# Patient Record
Sex: FEMALE | Race: BLACK OR AFRICAN AMERICAN | Employment: UNEMPLOYED | URBAN - METROPOLITAN AREA
[De-identification: names, ages, dates, MRNs, and addresses within clinical notes are randomized per-mention and may not be internally consistent; named-entity substitution may affect disease eponyms.]

---

## 2017-02-02 ENCOUNTER — APPOINTMENT (OUTPATIENT)
Dept: GENERAL RADIOLOGY | Age: 57
End: 2017-02-02
Attending: EMERGENCY MEDICINE
Payer: SELF-PAY

## 2017-02-02 ENCOUNTER — HOSPITAL ENCOUNTER (EMERGENCY)
Age: 57
Discharge: HOME | End: 2017-02-03
Attending: EMERGENCY MEDICINE
Payer: SELF-PAY

## 2017-02-02 DIAGNOSIS — R07.9 CHEST PAIN, UNSPECIFIED TYPE: ICD-10-CM

## 2017-02-02 DIAGNOSIS — R06.02 SOB (SHORTNESS OF BREATH): Primary | ICD-10-CM

## 2017-02-02 DIAGNOSIS — I10 ESSENTIAL HYPERTENSION: ICD-10-CM

## 2017-02-02 DIAGNOSIS — R31.9 HEMATURIA: ICD-10-CM

## 2017-02-02 DIAGNOSIS — R73.9 HYPERGLYCEMIA: ICD-10-CM

## 2017-02-02 DIAGNOSIS — R10.13 ABDOMINAL PAIN, EPIGASTRIC: ICD-10-CM

## 2017-02-02 DIAGNOSIS — R05.9 COUGH: ICD-10-CM

## 2017-02-02 DIAGNOSIS — R10.9 ACUTE LEFT FLANK PAIN: ICD-10-CM

## 2017-02-02 DIAGNOSIS — R74.01 ELEVATED AST (SGOT): ICD-10-CM

## 2017-02-02 DIAGNOSIS — R77.8 ELEVATED TROPONIN: ICD-10-CM

## 2017-02-02 DIAGNOSIS — R93.5 ABNORMAL ABDOMINAL CT SCAN: ICD-10-CM

## 2017-02-02 DIAGNOSIS — R11.0 NAUSEA WITHOUT VOMITING: ICD-10-CM

## 2017-02-02 PROCEDURE — 96361 HYDRATE IV INFUSION ADD-ON: CPT

## 2017-02-02 PROCEDURE — 87086 URINE CULTURE/COLONY COUNT: CPT | Performed by: PHYSICIAN ASSISTANT

## 2017-02-02 PROCEDURE — 81001 URINALYSIS AUTO W/SCOPE: CPT | Performed by: PHYSICIAN ASSISTANT

## 2017-02-02 PROCEDURE — 77030013140 HC MSK NEB VYRM -A

## 2017-02-02 PROCEDURE — 96374 THER/PROPH/DIAG INJ IV PUSH: CPT

## 2017-02-02 PROCEDURE — 99285 EMERGENCY DEPT VISIT HI MDM: CPT

## 2017-02-02 PROCEDURE — 93005 ELECTROCARDIOGRAM TRACING: CPT

## 2017-02-02 PROCEDURE — 94640 AIRWAY INHALATION TREATMENT: CPT

## 2017-02-02 PROCEDURE — 96375 TX/PRO/DX INJ NEW DRUG ADDON: CPT

## 2017-02-02 PROCEDURE — 71020 XR CHEST PA LAT: CPT

## 2017-02-02 RX ORDER — METFORMIN HYDROCHLORIDE 500 MG/1
500 TABLET ORAL 2 TIMES DAILY WITH MEALS
COMMUNITY
End: 2017-02-02 | Stop reason: SDUPTHER

## 2017-02-02 NOTE — Clinical Note
Drink plenty of water as the more hydrated you remain, the thinner your secretion/sputum will be which which in turn should help it come up and out of the upper and lower airways. Use your Albuterol inhaler WITH A Spacer (Aerochamber device): 2 puffs  every 4 hours as needed for cough, wheeze, chest tightness, shortness of breath until symptoms improve. Note: The medication Albuterol helps open the lower airways but, only is used as directed. If it not used properly it will likely stick to the mo uth and throat and never enter the lower airways of the lungs as intended and thereby not work as effectively. REMEMBER TO ASK YOUR LOCAL PHARMACIST FOR HELP DEMONSTRATING HOW TO USE/TAKE YOUR MEDICATIONS PRIOR TO LEAVING THE PHARMACY. ENSURE YOU REV IEW ALL OF THE INFORMATION PROVIDED WITH ANY MEDICATIONS AND OR MEDICAL DEVICES. Please take your medications as prescribed without fail. Avoid exercise, high impact activities until you are well again. PLEASE FOLLOW-UP AS DIRECTED WITHOUT KATIE L WITHIN THE TIME FRAME RECOMMENDED AS FAILURE TO DO SO COULD RESULT IN WORSENING OF YOUR PHYSICAL CONDITION, DEATH, AND OR PERMANENT DISABILITY. RETURN TO THE EMERGENCY DEPARTMENT IF YOU ARE UNABLE TO FOLLOW-UP AS DIRECTED. RETURN TO THE EMERGEN CY DEPARTMENT IF YOU HAVE SYMPTOMS THAT DO NOT IMPROVE WITH TREATMENT, NEW SYMPTOMS, WORSENING SYMPTOMS, OR ANY OTHER CONCERNS. Ga Baron 
 
(Formerly: [facility] and [facility]) [facility]: In 1992, the [facility] the [facility] established a [employer], representing all major [employer], conducted studies that revealed both a shortage and misdistribution of primary care physi marisabel able to provide primary care to an estimated 37,000 medically indigent [place] residents. Many of these individuals were gainfully employed but did not have health insurance coverage. As a result, a large number of uninsured residents, as well  as those insured by [facility] and Medicare, but unable to find a doctor, resorted to the inappropriate use of the [facility], [facility] department and the hospital emergency rooms for their primary care. [facility], formerly known as [address] [place] [address], opened its doors as a federally qualified health center on May 14, 1995, in [place], [address] [place] [address], with two doctors, one family nurse practitioner and eleven clinical support and administrative staff. In [address] [place] [address], expanded its services to the [facility]. The [place] site is called [employer], and it is located at the corner of [address] [place] [address], directly across from [facility] and [facility]. During 20 09 the two sites together served more than 8,621 patients with [phone] medical and dental patient visits. Over 22% of the patients were diagnosed with hypertension, 15% with diabetes and over 45% received well child examinations. Services include but  are not limited to the following: 
Treatment for both acute and chronic illnesses such as diabetes, stroke, hypertension, diet and nutrition Programs and [place] Services include but are not limited to the following: 
Adult and Pediatric health care Diagnosis and treatment of minor and chronic illnesses Flu and Pneumonia vaccines Laboratory services Medication assistance Health education services Case management services Referral program to specialty providers [facility] planning services Prenatal care and post partum visits Healthy Infant Planning Pediatric well baby, immunizations and school/sports physicals Health Care for the [facility] accepts all commercial insurances, Medic aid and [employer]. Individuals without insurance pay according to a sliding fee scale based on the income of their household and Federal Poverty Guidelines. OFFICE LOCATIONS:  
[facility] [address] [address] [address] [place] [address] Before you begin, please have the following information ready: 
[employer] card Patients place of employment, including address and phone number Patients primary care  provider name and contact information Business Hours Hours of Operation [facility] [address] [place], [address] Monday thru Friday 8:00 AM  5:00 PM 
Every 2nd Saturday 8:00 AM  12:00 PM 
 
 
[employer] [phone] [address] [place] [address] Tito Monday, Tuesday, Thursday 8:00 AM  7:00 PM 
Wednesday and Friday 8:00 AM  5:00 PM 
Every 1st, 3rd and 4th Saturday 8:00 AM  12:30 PM 
 
 
[address] [address] [facility] [address] Monday thru Friday 8:00 AM  5:00 PM 
 
After Hours If you have a life threatening emergency, please call 911. Please identify your provider by name when you obtain services at a local emergency room, urgent care center or you  are admitted as inpatient into the hospital. 
 
Our physicians and dentists are on call 24 hours a day, 7 days a week 365 days per year. You can reach your physician or dentist by calling [phone]. Please Note All locations are closed on Kimper ays and all major holidays [address] Dental services are available for adults and children at the [place] site and via the mobile dental Ashley Rough in all areas of [facility]. Services include: 
Examinations and X-rays: *  Review and update medical history in order to ensure that dental treatment does not conflict with current health conditions or medications* Oral cancer examination* Visual examination and x-rays of teeth to help detect decay or deterioration* Observation  of biting, chewing and grinding patterns* Evaluation of gum tissue and bone support* Preventative and restorative dental care* Stain removal, removal of plaque and tartar and teeth polishing* Recommendations on brushing, flossing, fluoride applications,  sealants and frequency of oral examinations* Discussions of concerns or questions regarding regular oral care or possible cosmetic enhancements Preventive: * Stain, plaque and tartar removal and teeth polishing* Deep scale using anesthetic when necess chloe* Recommendations on brushing and flossing* Fluoride applications and dental sealants as needed* Discussion on the frequency for oral examination Restorative Visits: * Dental fillings such as amalgam or composite restorations* Dental extractions* Ro ot canals of anterior teeth* Partial or complete dentures Emergency Visits: * Extraction or tooth removal. (An appointment to alleviate pain is usually available within 24-48 hours.) Office Hours: Monday-Friday: 8:00am-5:00pm 
For an appointment nikki l: [phone] Fax: [phone] 
Stewart Ybarra [address] [place], [facility]  DON'T HAVE INSURANCE?  NEED EVALUATION FOR GENERAL MEDICAL PROBLEMS BECAUSE YOU DON'T HAVE A PRIMARY CARE DOCTOR OR ARE AWAITING AN APPOINTMENT WITH A CLINIC BUT NEED TO BE SEEN BEFORE AN APPOINTMENT IS AVAILABLE? The [facility] [phone]: is a free medical service that provides general medical care to uninsured adults and children in [address] on the [place]. Serv ices include routine evaluation and treatment of common acute illnesses including: 
Gastritis and heart burn Minor skin rashes Minor musculoskeletal pains Respiratory infections Urinary tract and bladder infections Headaches, ear aches, and pink eye Chronic conditions such as diabetes and hypertension The [facility] also offers sports physicals, children's health insurance enrollment, and health education services. Medical conditions that are beyond the team's scope of care are referred to anot her care setting. The [facility] team is bilingual and composed of registered nurses, licensed practical nurses, physicians, patient technicians, and outreach workers. Community partners, including free clinics, local health agencies, and numerous [place] -based [facility], collaborate with us. [employer], grants, and contributions sponsors the [facility] to ensure that services are free for all patients. To learn more about our [address] including locations and times pleas e call us at [address] [place] [address] ([phone]).

## 2017-02-03 ENCOUNTER — APPOINTMENT (OUTPATIENT)
Dept: CT IMAGING | Age: 57
End: 2017-02-03
Attending: PHYSICIAN ASSISTANT
Payer: SELF-PAY

## 2017-02-03 VITALS
WEIGHT: 160 LBS | OXYGEN SATURATION: 94 % | TEMPERATURE: 99 F | SYSTOLIC BLOOD PRESSURE: 136 MMHG | DIASTOLIC BLOOD PRESSURE: 70 MMHG | HEART RATE: 76 BPM | RESPIRATION RATE: 16 BRPM

## 2017-02-03 LAB
ALBUMIN SERPL BCP-MCNC: 2.9 G/DL (ref 3.4–5)
ALBUMIN/GLOB SERPL: 0.8 {RATIO} (ref 0.8–1.7)
ALP SERPL-CCNC: 77 U/L (ref 45–117)
ALT SERPL-CCNC: 56 U/L (ref 13–56)
ANION GAP BLD CALC-SCNC: 9 MMOL/L (ref 3–18)
APPEARANCE UR: CLEAR
AST SERPL W P-5'-P-CCNC: 39 U/L (ref 15–37)
ATRIAL RATE: 71 BPM
ATRIAL RATE: 79 BPM
BACTERIA URNS QL MICRO: NEGATIVE /HPF
BASOPHILS # BLD AUTO: 0 K/UL (ref 0–0.06)
BASOPHILS # BLD: 1 % (ref 0–2)
BILIRUB SERPL-MCNC: 0.3 MG/DL (ref 0.2–1)
BILIRUB UR QL: NEGATIVE
BNP SERPL-MCNC: 20 PG/ML (ref 0–900)
BUN SERPL-MCNC: 9 MG/DL (ref 7–18)
BUN/CREAT SERPL: 12 (ref 12–20)
CALCIUM SERPL-MCNC: 8.3 MG/DL (ref 8.5–10.1)
CALCULATED P AXIS, ECG09: 53 DEGREES
CALCULATED P AXIS, ECG09: 65 DEGREES
CALCULATED R AXIS, ECG10: -5 DEGREES
CALCULATED R AXIS, ECG10: 3 DEGREES
CALCULATED T AXIS, ECG11: 57 DEGREES
CALCULATED T AXIS, ECG11: 63 DEGREES
CHLORIDE SERPL-SCNC: 107 MMOL/L (ref 100–108)
CK MB CFR SERPL CALC: 0.4 % (ref 0–4)
CK MB SERPL-MCNC: 0.9 NG/ML (ref 0.5–3.6)
CK SERPL-CCNC: 213 U/L (ref 26–192)
CO2 SERPL-SCNC: 25 MMOL/L (ref 21–32)
COLOR UR: YELLOW
CREAT SERPL-MCNC: 0.78 MG/DL (ref 0.6–1.3)
DIAGNOSIS, 93000: NORMAL
DIAGNOSIS, 93000: NORMAL
DIFFERENTIAL METHOD BLD: NORMAL
EOSINOPHIL # BLD: 0.1 K/UL (ref 0–0.4)
EOSINOPHIL NFR BLD: 2 % (ref 0–5)
EPITH CASTS URNS QL MICRO: NORMAL /LPF (ref 0–5)
ERYTHROCYTE [DISTWIDTH] IN BLOOD BY AUTOMATED COUNT: 13.1 % (ref 11.6–14.5)
GLOBULIN SER CALC-MCNC: 3.7 G/DL (ref 2–4)
GLUCOSE SERPL-MCNC: 212 MG/DL (ref 74–99)
GLUCOSE UR STRIP.AUTO-MCNC: 250 MG/DL
HCT VFR BLD AUTO: 36.6 % (ref 35–45)
HGB BLD-MCNC: 12 G/DL (ref 12–16)
HGB UR QL STRIP: ABNORMAL
KETONES UR QL STRIP.AUTO: NEGATIVE MG/DL
LEUKOCYTE ESTERASE UR QL STRIP.AUTO: NEGATIVE
LIPASE SERPL-CCNC: 203 U/L (ref 73–393)
LYMPHOCYTES # BLD AUTO: 48 % (ref 21–52)
LYMPHOCYTES # BLD: 3 K/UL (ref 0.9–3.6)
MCH RBC QN AUTO: 27.4 PG (ref 24–34)
MCHC RBC AUTO-ENTMCNC: 32.8 G/DL (ref 31–37)
MCV RBC AUTO: 83.6 FL (ref 74–97)
MONOCYTES # BLD: 0.5 K/UL (ref 0.05–1.2)
MONOCYTES NFR BLD AUTO: 8 % (ref 3–10)
NEUTS SEG # BLD: 2.5 K/UL (ref 1.8–8)
NEUTS SEG NFR BLD AUTO: 41 % (ref 40–73)
NITRITE UR QL STRIP.AUTO: NEGATIVE
P-R INTERVAL, ECG05: 158 MS
P-R INTERVAL, ECG05: 162 MS
PH UR STRIP: 5 [PH] (ref 5–8)
PLATELET # BLD AUTO: 185 K/UL (ref 135–420)
PMV BLD AUTO: 10.2 FL (ref 9.2–11.8)
POTASSIUM SERPL-SCNC: 4.6 MMOL/L (ref 3.5–5.5)
PROT SERPL-MCNC: 6.6 G/DL (ref 6.4–8.2)
PROT UR STRIP-MCNC: 100 MG/DL
Q-T INTERVAL, ECG07: 414 MS
Q-T INTERVAL, ECG07: 424 MS
QRS DURATION, ECG06: 74 MS
QRS DURATION, ECG06: 76 MS
QTC CALCULATION (BEZET), ECG08: 449 MS
QTC CALCULATION (BEZET), ECG08: 486 MS
RBC # BLD AUTO: 4.38 M/UL (ref 4.2–5.3)
RBC #/AREA URNS HPF: NORMAL /HPF (ref 0–5)
SODIUM SERPL-SCNC: 141 MMOL/L (ref 136–145)
SP GR UR REFRACTOMETRY: 1.01 (ref 1–1.03)
TROPONIN I BLD-MCNC: <0.04 NG/ML (ref 0–0.08)
TROPONIN I SERPL-MCNC: 0.06 NG/ML (ref 0–0.04)
UROBILINOGEN UR QL STRIP.AUTO: 0.2 EU/DL (ref 0.2–1)
VENTRICULAR RATE, ECG03: 71 BPM
VENTRICULAR RATE, ECG03: 79 BPM
WBC # BLD AUTO: 6.1 K/UL (ref 4.6–13.2)
WBC URNS QL MICRO: NORMAL /HPF (ref 0–4)

## 2017-02-03 PROCEDURE — 83880 ASSAY OF NATRIURETIC PEPTIDE: CPT | Performed by: PHYSICIAN ASSISTANT

## 2017-02-03 PROCEDURE — 74011250637 HC RX REV CODE- 250/637: Performed by: PHYSICIAN ASSISTANT

## 2017-02-03 PROCEDURE — 84484 ASSAY OF TROPONIN QUANT: CPT | Performed by: PHYSICIAN ASSISTANT

## 2017-02-03 PROCEDURE — 74011250636 HC RX REV CODE- 250/636: Performed by: PHYSICIAN ASSISTANT

## 2017-02-03 PROCEDURE — 74176 CT ABD & PELVIS W/O CONTRAST: CPT

## 2017-02-03 PROCEDURE — 80053 COMPREHEN METABOLIC PANEL: CPT | Performed by: PHYSICIAN ASSISTANT

## 2017-02-03 PROCEDURE — 74011000250 HC RX REV CODE- 250: Performed by: PHYSICIAN ASSISTANT

## 2017-02-03 PROCEDURE — 83690 ASSAY OF LIPASE: CPT | Performed by: PHYSICIAN ASSISTANT

## 2017-02-03 PROCEDURE — 93005 ELECTROCARDIOGRAM TRACING: CPT

## 2017-02-03 PROCEDURE — 85025 COMPLETE CBC W/AUTO DIFF WBC: CPT | Performed by: PHYSICIAN ASSISTANT

## 2017-02-03 PROCEDURE — 82550 ASSAY OF CK (CPK): CPT | Performed by: PHYSICIAN ASSISTANT

## 2017-02-03 RX ORDER — ONDANSETRON 2 MG/ML
4 INJECTION INTRAMUSCULAR; INTRAVENOUS
Status: COMPLETED | OUTPATIENT
Start: 2017-02-03 | End: 2017-02-03

## 2017-02-03 RX ORDER — CLONIDINE HYDROCHLORIDE 0.1 MG/1
1 TABLET ORAL DAILY
Refills: 0 | COMMUNITY
Start: 2016-11-23 | End: 2022-06-09

## 2017-02-03 RX ORDER — FAMOTIDINE 10 MG/ML
20 INJECTION INTRAVENOUS
Status: COMPLETED | OUTPATIENT
Start: 2017-02-03 | End: 2017-02-03

## 2017-02-03 RX ORDER — ASPIRIN 325 MG
325 TABLET ORAL
Status: COMPLETED | OUTPATIENT
Start: 2017-02-03 | End: 2017-02-03

## 2017-02-03 RX ORDER — ALBUTEROL SULFATE 90 UG/1
2 AEROSOL, METERED RESPIRATORY (INHALATION)
Qty: 1 INHALER | Refills: 0 | Status: SHIPPED | OUTPATIENT
Start: 2017-02-03 | End: 2021-12-07

## 2017-02-03 RX ORDER — LISINOPRIL 10 MG/1
1 TABLET ORAL DAILY
Refills: 0 | COMMUNITY
Start: 2016-12-27 | End: 2021-12-07

## 2017-02-03 RX ORDER — CODEINE PHOSPHATE AND GUAIFENESIN 10; 100 MG/5ML; MG/5ML
10 SOLUTION ORAL
Qty: 120 ML | Refills: 0 | Status: SHIPPED | OUTPATIENT
Start: 2017-02-03 | End: 2021-12-07

## 2017-02-03 RX ORDER — LOVASTATIN 10 MG/1
1 TABLET ORAL
Refills: 0 | COMMUNITY
Start: 2016-12-27 | End: 2022-06-09

## 2017-02-03 RX ORDER — IPRATROPIUM BROMIDE AND ALBUTEROL SULFATE 2.5; .5 MG/3ML; MG/3ML
3 SOLUTION RESPIRATORY (INHALATION) ONCE
Status: COMPLETED | OUTPATIENT
Start: 2017-02-03 | End: 2017-02-03

## 2017-02-03 RX ORDER — AZITHROMYCIN 250 MG/1
TABLET, FILM COATED ORAL
Qty: 6 TAB | Refills: 0 | Status: SHIPPED | OUTPATIENT
Start: 2017-02-03 | End: 2021-12-07

## 2017-02-03 RX ORDER — GLIPIZIDE 10 MG/1
1 TABLET ORAL DAILY
Refills: 0 | COMMUNITY
Start: 2016-11-23

## 2017-02-03 RX ORDER — METFORMIN HYDROCHLORIDE 1000 MG/1
1 TABLET ORAL 2 TIMES DAILY
Refills: 0 | COMMUNITY
Start: 2016-11-23

## 2017-02-03 RX ADMIN — IPRATROPIUM BROMIDE AND ALBUTEROL SULFATE 3 ML: .5; 3 SOLUTION RESPIRATORY (INHALATION) at 01:12

## 2017-02-03 RX ADMIN — ONDANSETRON 4 MG: 2 INJECTION INTRAMUSCULAR; INTRAVENOUS at 01:12

## 2017-02-03 RX ADMIN — SODIUM CHLORIDE 500 ML: 900 INJECTION, SOLUTION INTRAVENOUS at 01:12

## 2017-02-03 RX ADMIN — FAMOTIDINE 20 MG: 10 INJECTION, SOLUTION INTRAVENOUS at 01:12

## 2017-02-03 RX ADMIN — ASPIRIN 325 MG ORAL TABLET 325 MG: 325 PILL ORAL at 02:09

## 2017-02-03 NOTE — ED NOTES
I have reviewed discharge instructions with the patient. The patient verbalized understanding. Discharge medications reviewed with patient and son and appropriate educational materials and side effects teaching were provided. Patient armband removed and shredded.

## 2017-02-03 NOTE — ED PROVIDER NOTES
HPI Comments: Deborah Espinoza is a  64 y.o. Normal build  [place] female non-smoker h/o HLP, HTN, NIDDM presents to the ED via POV w/ son c/o dry cough sporadic x 2 weeks. She admits chest discomfort at times, and feels \"winded\" over the last few days. She admits intermittent upper abdominal pain and left flank pain. Her son says she has been c/o nausea and has spit up a couple times. Denies fever/chills, dizziness/LOC, HA, sinus pain/congestion/runny nose, st, jaw/neck pain/stiffness, palpitations, ROE, orthopnea, calf pain/swelling, hemoptysis, wheeze, chest tightness, blood in vomitus, constipation, brbpr, melena, blood in urine, decreased urination, difficulty urinating, extremity pain/weakness/numbness/tingling. Patient is a 64 y.o. female presenting with cough. Cough   Associated symptoms include chest pain (\"My chest hurts sometimes. \" ), shortness of breath (\"I Feel winded sometimes. \" ), nausea and vomiting. Pertinent negatives include no chills, no ear pain, no headaches, no rhinorrhea, no sore throat and no wheezing. Past Medical History:   Diagnosis Date    Diabetes ([address]      NIDDM    Hyperlipidemia     Hypertension        History reviewed. No pertinent past surgical history. History reviewed. No pertinent family history. Social History     Social History    Marital status:      Spouse name: N/A    Number of children: N/A    Years of education: N/A     Occupational History    Not on file. Social History Main Topics    Smoking status: Never Smoker    Smokeless tobacco: Not on file    Alcohol use No    Drug use: Not on file    Sexual activity: Not on file     Other Topics Concern    Not on file     Social History Narrative         ALLERGIES: Review of patient's allergies indicates no known allergies. Review of Systems   Constitutional: Negative for chills, diaphoresis, fatigue and fever.    HENT: Negative for congestion, ear pain, rhinorrhea, sinus pressure and sore throat. Eyes: Negative for pain and visual disturbance. Respiratory: Positive for cough (x 2 weeks, sporadic. ), chest tightness and shortness of breath (\"I Feel winded sometimes. \" ). Negative for wheezing. Cardiovascular: Positive for chest pain (\"My chest hurts sometimes. \" ). Negative for palpitations and leg swelling. Gastrointestinal: Positive for abdominal pain, nausea and vomiting. Negative for blood in stool, constipation and diarrhea. Genitourinary: Positive for flank pain (Left Flank). Negative for decreased urine volume, difficulty urinating, dysuria, frequency, hematuria and urgency. Musculoskeletal: Positive for back pain. Negative for arthralgias and joint swelling. Skin: Negative for color change, pallor, rash and wound. Neurological: Negative for dizziness, syncope, weakness, light-headedness and headaches. Psychiatric/Behavioral: Negative for behavioral problems. The patient is not nervous/anxious. Vitals:    02/02/17 2103 02/03/17 0045   BP: 146/86 148/82   Pulse: 76    Resp: 16    Temp: 99 °F (37.2 °C)    SpO2: 100% 99%   Weight: 72.6 kg (160 lb)             Physical Exam   Constitutional: She is oriented to person, place, and time. She appears well-developed and well-nourished. No distress. HENT:   Head: Normocephalic and atraumatic. Right Ear: Hearing, tympanic membrane, external ear and ear canal normal.   Left Ear: Hearing, tympanic membrane, external ear and ear canal normal.   Nose: Nose normal. No mucosal edema or rhinorrhea. Right sinus exhibits no maxillary sinus tenderness and no frontal sinus tenderness. Left sinus exhibits no maxillary sinus tenderness and no frontal sinus tenderness. Mouth/Throat: Uvula is midline, oropharynx is clear and moist and mucous membranes are normal. No trismus in the jaw. No oropharyngeal exudate, posterior oropharyngeal edema, posterior oropharyngeal erythema or tonsillar abscesses.    Eyes: Conjunctivae and EOM are normal. Pupils are equal, round, and reactive to light. Right eye exhibits no discharge. Left eye exhibits no discharge. No scleral icterus. Neck: Trachea normal, normal range of motion, full passive range of motion without pain and phonation normal. Neck supple. No tracheal tenderness, no spinous process tenderness and no muscular tenderness present. No rigidity. No tracheal deviation, no edema, no erythema and normal range of motion present. No thyromegaly present. Supple, Symmetric Neck. No LAD. Normal phonation. Speech is clear. Cardiovascular: Normal rate, regular rhythm and normal heart sounds. Exam reveals no gallop and no friction rub. No murmur heard. Pulmonary/Chest: Effort normal. No accessory muscle usage or stridor. No tachypnea. No respiratory distress. She has decreased breath sounds. She has no wheezes. She has no rhonchi. She has no rales. Symmetric rise/fall of the chest wall. Speaks in full sentences. Great inspiratory effort. No tachypnea. No labored Respiration. BLE: NO calf edema/swelling or TTP. Abdominal: Soft. Normal appearance and bowel sounds are normal. She exhibits no distension, no abdominal bruit and no pulsatile midline mass. There is tenderness in the epigastric area. There is CVA tenderness. There is no rigidity, no rebound and no guarding. Rotund abdomen. Soft. Normoactive BS. No guarding. No rebound TTP. + TTP Epigastrium. + CVAT Left   Musculoskeletal:        Thoracic back: Normal.        Lumbar back: She exhibits tenderness, pain and spasm. She exhibits normal range of motion, no bony tenderness, no swelling, no edema, no deformity, no laceration and normal pulse. Right lower leg: Normal. She exhibits no tenderness, no bony tenderness, no swelling, no edema, no deformity and no laceration.         Left lower leg: Normal. She exhibits no tenderness, no bony tenderness, no swelling, no edema, no deformity and no laceration. Lumbar: Normal inspection. No midline TTP. + L>R Paraspinal muscle TTP. Gait is normal.     MS 5/5 BUE/BLE. Lymphadenopathy:     She has no cervical adenopathy. Neurological: She is alert and oriented to person, place, and time. She has normal strength. She is not disoriented. No sensory deficit. MS 5/5 BUE/BLE. Speech is clear. Gait is normal.    Skin: Skin is warm, dry and intact. No abrasion, no bruising, no burn, no ecchymosis, no laceration, no lesion, no petechiae and no rash noted. She is not diaphoretic. No cyanosis or erythema. No pallor. Vitals reviewed. MDM  Number of Diagnoses or Management Options  Abdominal pain, epigastric: new and requires workup  Acute left flank pain: new and requires workup  Chest pain, unspecified type: new and requires workup  Cough: new and requires workup  Elevated AST (SGOT): new and requires workup  Elevated troponin: new and requires workup  Hematuria: new and requires workup  Hyperglycemia: new and requires workup  Nausea without vomiting: new and requires workup  SOB (shortness of breath): new and requires workup  Diagnosis management comments: DDX: Chest pain, Atypical CP, Musculoskeletal CP, Chest wall contusion, Pleuritis, Pleurisy, ACS, Pneumothorax, Pneumonia, Bronchitis, Pleurodynia, Pericarditis, Pleural Effusion, Atelectasis, Pericardial Effusion, CHF, Gastro-esophageal spasm, Esophagitis, Gastritis, Rib fracture, Costochondritis. DDX: Abdominal pain, Pregnancy, Pancreatitis, Ectopic Pregnancy, Gastritis, AMI, Gastroenteritis, Colitis, Diverticulitis, Ovarian Cyst, Ovarian Torsion, Tubo-Ovarian Abscess, PID, PUD, Cholecystitis, Choledocholithiasis, Mesenteric Ischemia, Ectopic Pregnancy Rupture, Pelvic Pain Syndrome, Acute Cystitis, Pyelonephritis, Renal Colic, Biliary Colic, Perforation, Splenic Flexure Syndrome, Abdominal Aortic Aneurysm, Gastrointestinal Bleed.      DDX: Sciatica, Lumbar radiculopathy, Back Sprain, Back Strain, DJD, HNP, Epidural abscess, Cauda Equina Syndrome, Contusion, Pyelonephritis, Renal Colic, Rib Fracture, Rib Contusion, Costochondritis, Pleurisy, Pleurodynia, Pneumothorax, Thoracic Aneurysm, Abdominal Aortic Aneurysm, Nephro-ureteral Calculus, Acute Myocardial Infarction. ED EKG interpretation:  Rhythm: Normal Sinus Rhythm;  Vent rate: 71 bpm; WY Interval: 162 ms; QRS duration: 76 ms; QT/QTc: 414/449 ms; P-R-T axes: 65 -5 63. Other findings: Minimal Voltage Criteria for LVH, May be Normal Variant, Borderline ekg. EKG was completed at 2207. This EKG was interpreted by Dr. Nabil Parish (ED attending) at [phone].    12:52 AM: CXR PRELIMINARY RESULT BY Resident Dr. [facility]in. [facility]   Findings:   - No acute cardiopulmonary process. 1:42 AM:  (H), Troponin 0.06 (H). Glucose 212 (H). Will repeat CE at 5 am as this patient has difficulty expressing sx with clarity, she has HTN, Diabetes, HLP & 65 y/o AAF.     2:00 AM: CT ABD/PELVIS WITHOUT CONTRAST PRELIMINARY READING BY Resident Dr. Chucky Valentin. [facility]   Findings:   - Non-obstructive left kidney lower pole calculus. No hydronephrosis. Right kidney normal.   - Fecalized mostly non-dilated loops of small bowel (a few scattered mildly prominent loops), suggestive of an ileus or slow transit state.   - Cholelithiasis w/o secondary signs of cholecystitis. - 5 mm RLL lateral subpleural lung nodule. - Normal appendix w/ air in the tip. - Fat containing umbilical hernia. Discussed with Dr. Nabil Parish (EP) concerning patient Comfort A Ashaka, standard discussion of reason for visit, HPI, ROS, PE, and current results available. Recommendation for awaiting repeat Cardiac Ez, Examine Patient & Determine Dispo. He agrees to assume the care of this patient at this time as the previous provider's shift has come to a close.  He would like to perform POC Troponin at 3 HOURS which would be 3 am.   Cynda Libman, PA  February 3, 2017  2:55 AM              Amount and/or Complexity of Data Reviewed  Clinical lab tests: reviewed and ordered  Tests in the radiology section of CPT®: ordered and reviewed  Tests in the medicine section of CPT®: ordered and reviewed  Discussion of test results with the performing providers: yes  Decide to obtain previous medical records or to obtain history from someone other than the patient: yes  Obtain history from someone other than the patient: yes (Son)  Review and summarize past medical records: yes  Independent visualization of images, tracings, or specimens: yes        Procedures    Diagnosis:   1. SOB (shortness of breath)    2. Chest pain, unspecified type    3. Cough    4. Abdominal pain, epigastric    5. Nausea without vomiting    6. Acute left flank pain    7. Hematuria    8. Elevated troponin    9. Hyperglycemia    10. Elevated AST (SGOT)    11. Abnormal abdominal CT scan    12. Essential hypertension          Disposition: TBD    Follow-up Information     Follow up With Details Comments Contact Info    [facility] EMERGENCY DEPT  As needed, If symptoms worsen [address] [place] [address] Labor, NP Call today Schedule appointment to be seen within 3 days without fail for re-evaluation, review all imaging results/lab results and recheck blood pressure without fail. Angel Ferro [address], DO Call today Cardiology: Schedule appointment to be seen within 3 days for evaluation without fail. [id number] [address]            Patient's Medications   Start Taking    ALBUTEROL (PROVENTIL HFA, VENTOLIN HFA, PROAIR HFA) 90 MCG/ACTUATION INHALER    Take 2 Puffs by inhalation every four (4) hours as needed for Wheezing or Shortness of Breath (Cough). AZITHROMYCIN (ZITHROMAX Z-JJ) 250 MG TABLET    USE AS DIRECTED ON LABELING. TAKE UNTIL FINISHED.     GUAIFENESIN-CODEINE (ROBITUSSIN AC) 100-10 MG/5 ML SOLUTION    Take 10 mL by mouth three (3) times daily as needed for Cough or Congestion (CAUTION MAY CAUSE DROWSINESS. DO NOT FILL UNLESS ZPAK, ALBUTEROL MDI, SPACER DEVICE ARE FILLED.). Max Daily Amount: 30 mL. INHALATIONAL SPACING DEVICE    ALWAYS USE WITH INHALER   Continue Taking    CLONIDINE HCL (CATAPRES) 0.1 MG TABLET    Take 1 Tab by mouth daily. 11/23/16, QTY#30, 30 Days. RADHA Renteria    GLIPIZIDE (GLUCOTROL) 10 MG TABLET    Take 1 Tab by mouth daily. 11/23/16, QTY#30, 30 Days. RADHA Renteria    LISINOPRIL (PRINIVIL, ZESTRIL) 10 MG TABLET    Take 1 Tab by mouth daily. 12/27/16, QTY#30, 30 Days. RADHA Renteria    LOVASTATIN (MEVACOR) 10 MG TABLET    Take 1 Tab by mouth nightly. 12/27/16, QTY#30, 30 Days, RADHA Renteria    METFORMIN (GLUCOPHAGE) 1,000 MG TABLET    Take 1 Tab by mouth two (2) times a day. 11/23/16, QTY#60, 30 days. RADHA Minor   These Medications have changed    No medications on file   Stop Taking    METFORMIN (GLUCOPHAGE) 500 MG TABLET    Take 500 mg by mouth two (2) times daily (with meals).        Recent Results (from the past 24 hour(s))   EKG, 12 LEAD, INITIAL    Collection Time: 02/02/17 10:07 PM   Result Value Ref Range    Ventricular Rate 71 BPM    Atrial Rate 71 BPM    P-R Interval 162 ms    QRS Duration 76 ms    Q-T Interval 414 ms    QTC Calculation (Bezet) 449 ms    Calculated P Axis 65 degrees    Calculated R Axis -5 degrees    Calculated T Axis 63 degrees    Diagnosis       Normal sinus rhythm  Minimal voltage criteria for LVH, may be normal variant  Borderline ECG  No previous ECGs available     URINALYSIS W/ RFLX MICROSCOPIC    Collection Time: 02/02/17 11:13 PM   Result Value Ref Range    Color YELLOW      Appearance CLEAR      Specific gravity 1.015 1.005 - 1.030      pH (UA) 5.0 5.0 - 8.0      Protein 100 (A) NEG mg/dL    Glucose 250 (A) NEG mg/dL    Ketone NEGATIVE  NEG mg/dL    Bilirubin NEGATIVE  NEG      Blood MODERATE (A) NEG      Urobilinogen 0.2 0.2 - 1.0 EU/dL Nitrites NEGATIVE  NEG      Leukocyte Esterase NEGATIVE  NEG     URINE MICROSCOPIC ONLY    Collection Time: 02/02/17 11:13 PM   Result Value Ref Range    WBC 0 to 3 0 - 4 /hpf    RBC 4 to 10 0 - 5 /hpf    Epithelial cells 1+ 0 - 5 /lpf    Bacteria NEGATIVE  NEG /hpf   CBC WITH AUTOMATED DIFF    Collection Time: 02/03/17 12:20 AM   Result Value Ref Range    WBC 6.1 4.6 - 13.2 K/uL    RBC 4.38 4.20 - 5.30 M/uL    HGB 12.0 12.0 - 16.0 g/dL    HCT 36.6 35.0 - 45.0 %    MCV 83.6 74.0 - 97.0 FL    MCH 27.4 24.0 - 34.0 PG    MCHC 32.8 31.0 - 37.0 g/dL    RDW 13.1 11.6 - 14.5 %    PLATELET 708 376 - 172 K/uL    MPV 10.2 9.2 - 11.8 FL    NEUTROPHILS 41 40 - 73 %    LYMPHOCYTES 48 21 - 52 %    MONOCYTES 8 3 - 10 %    EOSINOPHILS 2 0 - 5 %    BASOPHILS 1 0 - 2 %    ABS. NEUTROPHILS 2.5 1.8 - 8.0 K/UL    ABS. LYMPHOCYTES 3.0 0.9 - 3.6 K/UL    ABS. MONOCYTES 0.5 0.05 - 1.2 K/UL    ABS. EOSINOPHILS 0.1 0.0 - 0.4 K/UL    ABS.  BASOPHILS 0.0 0.0 - 0.06 K/UL    DF AUTOMATED     CARDIAC PANEL,(CK, CKMB & TROPONIN)    Collection Time: 02/03/17 12:55 AM   Result Value Ref Range     (H) 26 - 192 U/L    CK - MB 0.9 0.5 - 3.6 ng/ml    CK-MB Index 0.4 0.0 - 4.0 %    Troponin-I, Qt. 0.06 (H) 0.0 - 0.045 NG/ML   PRO-BNP    Collection Time: 02/03/17 12:55 AM   Result Value Ref Range    NT pro-BNP 20 0 - 900 PG/ML   LIPASE    Collection Time: 02/03/17 12:55 AM   Result Value Ref Range    Lipase 203 73 - 056 U/L   METABOLIC PANEL, COMPREHENSIVE    Collection Time: 02/03/17 12:55 AM   Result Value Ref Range    Sodium 141 136 - 145 mmol/L    Potassium 4.6 3.5 - 5.5 mmol/L    Chloride 107 100 - 108 mmol/L    CO2 25 21 - 32 mmol/L    Anion gap 9 3.0 - 18 mmol/L    Glucose 212 (H) 74 - 99 mg/dL    BUN 9 7.0 - 18 MG/DL    Creatinine 0.78 0.6 - 1.3 MG/DL    BUN/Creatinine ratio 12 12 - 20      GFR est AA >60 >60 ml/min/1.73m2    GFR est non-AA >60 >60 ml/min/1.73m2    Calcium 8.3 (L) 8.5 - 10.1 MG/DL    Bilirubin, total 0.3 0.2 - 1.0 MG/DL ALT (SGPT) 56 13 - 56 U/L    AST (SGOT) 39 (H) 15 - 37 U/L    Alk.  phosphatase 77 45 - 117 U/L    Protein, total 6.6 6.4 - 8.2 g/dL    Albumin 2.9 (L) 3.4 - 5.0 g/dL    Globulin 3.7 2.0 - 4.0 g/dL    A-G Ratio 0.8 0.8 - 1.7

## 2017-02-03 NOTE — ED NOTES
Patient is a difficult stick, 2 attempts made on the right arm to establish an IV and draw blood were unsuccessful. Peterson Rico PA-C informed. Per Mann Hurtado, will wait for results from urine before attempting to stick patient again.

## 2017-02-03 NOTE — DISCHARGE INSTRUCTIONS
[employer] Activation    Thank you for requesting access to [employer]. Please follow the instructions below to securely access and download your online medical record. [employer] allows you to send messages to your doctor, view your test results, renew your prescriptions, schedule appointments, and more. How Do I Sign Up? 1. In your internet browser, go to [email or web address]  2. Click on the First Time User? Click Here link in the Sign In box. You will be redirect to the New Member Sign Up page. 3. Enter your [employer] Access Code exactly as it appears below. You will not need to use this code after youve completed the sign-up process. If you do not sign up before the expiration date, you must request a new code. [employer] Access Code: [email or web address]  Expires: 5/3/2017  9:23 PM (This is the date your [employer] access code will )    4. Enter the last four digits of your Social Security Number (xxxx) and Date of Birth (mm/dd/yyyy) as indicated and click Submit. You will be taken to the next sign-up page. 5. Create a [employer] ID. This will be your [employer] login ID and cannot be changed, so think of one that is secure and easy to remember. 6. Create a [employer] password. You can change your password at any time. 7. Enter your Password Reset Question and Answer. This can be used at a later time if you forget your password. 8. Enter your e-mail address. You will receive e-mail notification when new information is available in [address] 9. Click Sign Up. You can now view and download portions of your medical record. 10. Click the Download Summary menu link to download a portable copy of your medical information. Additional Information    If you have questions, please visit the Frequently Asked Questions section of the [employer] website at [email or web address] Remember, [employer] is NOT to be used for urgent needs. For medical emergencies, dial 911.

## 2017-02-04 LAB
BACTERIA SPEC CULT: NORMAL
SERVICE CMNT-IMP: NORMAL

## 2021-12-07 PROBLEM — E78.00 PURE HYPERCHOLESTEROLEMIA: Status: ACTIVE | Noted: 2020-03-16

## 2021-12-07 PROBLEM — M65.30 ACQUIRED TRIGGER FINGER: Status: ACTIVE | Noted: 2019-08-19

## 2021-12-07 PROBLEM — A41.9 SEPSIS (HCC): Status: ACTIVE | Noted: 2021-10-30

## 2021-12-07 PROBLEM — R60.0 LOCALIZED EDEMA: Status: ACTIVE | Noted: 2019-08-19

## 2021-12-07 PROBLEM — G57.93 NEUROPATHY INVOLVING BOTH LOWER EXTREMITIES: Status: ACTIVE | Noted: 2021-12-07

## 2021-12-07 PROBLEM — E11.9 TYPE 2 DIABETES MELLITUS WITHOUT COMPLICATION (HCC): Status: ACTIVE | Noted: 2019-08-19

## 2021-12-07 PROBLEM — N20.0 KIDNEY STONE: Status: ACTIVE | Noted: 2021-12-07

## 2022-07-07 PROBLEM — R82.71 BACTERIURIA: Status: ACTIVE | Noted: 2021-12-17

## 2022-07-07 PROBLEM — E11.00 HYPEROSMOLAR HYPERGLYCEMIC STATE (HHS) (HCC): Status: ACTIVE | Noted: 2021-12-17
